# Patient Record
Sex: MALE | Race: WHITE | NOT HISPANIC OR LATINO | Employment: FULL TIME | URBAN - METROPOLITAN AREA
[De-identification: names, ages, dates, MRNs, and addresses within clinical notes are randomized per-mention and may not be internally consistent; named-entity substitution may affect disease eponyms.]

---

## 2017-03-27 ENCOUNTER — OFFICE VISIT (OUTPATIENT)
Dept: FAMILY MEDICINE CLINIC | Facility: CLINIC | Age: 27
End: 2017-03-27

## 2017-03-27 VITALS
WEIGHT: 313 LBS | DIASTOLIC BLOOD PRESSURE: 72 MMHG | TEMPERATURE: 97.7 F | SYSTOLIC BLOOD PRESSURE: 120 MMHG | HEART RATE: 67 BPM | HEIGHT: 78 IN | OXYGEN SATURATION: 95 % | BODY MASS INDEX: 36.21 KG/M2

## 2017-03-27 DIAGNOSIS — N52.9 ERECTILE DYSFUNCTION, UNSPECIFIED ERECTILE DYSFUNCTION TYPE: ICD-10-CM

## 2017-03-27 DIAGNOSIS — Z13.9 SCREENING: Primary | ICD-10-CM

## 2017-03-27 DIAGNOSIS — R79.89 LOW TESTOSTERONE: ICD-10-CM

## 2017-03-27 DIAGNOSIS — E66.9 OBESITY (BMI 35.0-39.9 WITHOUT COMORBIDITY): ICD-10-CM

## 2017-03-27 PROCEDURE — 99214 OFFICE O/P EST MOD 30 MIN: CPT | Performed by: FAMILY MEDICINE

## 2017-03-27 NOTE — PROGRESS NOTES
"SUBJECTIVE:  The patient is a 26-year-old white male who comes in for several issues.  He's not been here in about 2 years.  He is an  and works for the [employer].  He was having decreased libido went to the \"men's clinic\" and had test done which revealed a low testosterone.  He has symptoms of ADD at times as well as the libido issue.     PAST MEDICAL HISTORY:  Reviewed.    REVIEW OF SYSTEMS:  Please see above; 14 point ROS otherwise negative.      OBJECTIVE: Vitals signs are reviewed and are stable.    HEENT: PERRLA.    Neck:  Supple.   No masses or thyromegaly  Lungs:  Clear.    Heart:  Regular rate and rhythm.    Abdomen:   Soft, nontender.   Obese.  Extremities:  No cyanosis, clubbing or edema.      ASSESSMENT:     Obesity with BMI of 36  Decreased libido  Low testosterone  \ED    PLAN:   Patient will be referred to a urologist.  Screening labs are ordered including TSH thyroid profile fasting lipids and CBC.  Testosterone level was not repeated.  He will call his labs.  Diet and exercise are encouraged.  Patient was given samples of Viagra 50 mg one every 24 hours as needed.    Much of this encounter note is an electronic transcription/translation of spoken language to printed text.  The electronic translation of spoken language may permit erroneous, or at times, nonsensical words or phrases to be inadvertently transcribed.  Although I have reviewed the note for such errors, some may still exist.  "

## 2017-03-31 LAB
ALBUMIN SERPL-MCNC: 4.4 G/DL (ref 3.5–5.2)
ALBUMIN/GLOB SERPL: 1.6 G/DL
ALP SERPL-CCNC: 45 U/L (ref 39–117)
ALT SERPL W P-5'-P-CCNC: 50 U/L (ref 1–41)
ANION GAP SERPL CALCULATED.3IONS-SCNC: 15.2 MMOL/L
AST SERPL-CCNC: 28 U/L (ref 1–40)
BILIRUB SERPL-MCNC: 0.7 MG/DL (ref 0.1–1.2)
BUN BLD-MCNC: 14 MG/DL (ref 6–20)
BUN/CREAT SERPL: 15.2 (ref 7–25)
CALCIUM SPEC-SCNC: 9.9 MG/DL (ref 8.6–10.5)
CHLORIDE SERPL-SCNC: 103 MMOL/L (ref 98–107)
CHOLEST SERPL-MCNC: 109 MG/DL (ref 0–200)
CO2 SERPL-SCNC: 22.8 MMOL/L (ref 22–29)
CREAT BLD-MCNC: 0.92 MG/DL (ref 0.76–1.27)
ERYTHROCYTE [DISTWIDTH] IN BLOOD BY AUTOMATED COUNT: 12.2 % (ref 4.5–15)
GFR SERPL CREATININE-BSD FRML MDRD: 98 ML/MIN/1.73
GLOBULIN UR ELPH-MCNC: 2.8 GM/DL
GLUCOSE BLD-MCNC: 87 MG/DL (ref 65–99)
HCT VFR BLD AUTO: 44.3 % (ref 35–60)
HDLC SERPL-MCNC: 29 MG/DL (ref 40–60)
HGB BLD-MCNC: 15 G/DL (ref 13.5–18)
LDLC SERPL CALC-MCNC: 65 MG/DL (ref 0–100)
LDLC/HDLC SERPL: 2.25 {RATIO}
LYMPHOCYTES # BLD AUTO: 1.5 10*3/MM3 (ref 1.2–3.4)
LYMPHOCYTES NFR BLD AUTO: 32.6 % (ref 21–51)
MCH RBC QN AUTO: 30 PG (ref 26.1–33.1)
MCHC RBC AUTO-ENTMCNC: 33.8 G/DL (ref 33–37)
MCV RBC AUTO: 88.8 FL (ref 80–99)
MONOCYTES # BLD AUTO: 0.2 10*3/MM3 (ref 0.1–0.6)
MONOCYTES NFR BLD AUTO: 4.2 % (ref 2–9)
NEUTROPHILS # BLD AUTO: 2.8 10*3/MM3 (ref 1.4–6.5)
NEUTROPHILS NFR BLD AUTO: 63.2 % (ref 42–75)
PLATELET # BLD AUTO: 238 10*3/MM3 (ref 150–450)
PMV BLD AUTO: 8 FL (ref 7.1–10.5)
POTASSIUM BLD-SCNC: 4.2 MMOL/L (ref 3.5–5.2)
PROT SERPL-MCNC: 7.2 G/DL (ref 6–8.5)
RBC # BLD AUTO: 4.99 10*6/MM3 (ref 4–6)
SODIUM BLD-SCNC: 141 MMOL/L (ref 136–145)
T-UPTAKE NFR SERPL: 0.89 TBI (ref 0.8–1.3)
T4 SERPL-MCNC: 7.41 MCG/DL (ref 4.5–11.7)
TRIGL SERPL-MCNC: 74 MG/DL (ref 0–150)
TSH SERPL DL<=0.05 MIU/L-ACNC: 1.02 MIU/ML (ref 0.27–4.2)
VLDLC SERPL-MCNC: 14.8 MG/DL (ref 5–40)
WBC NRBC COR # BLD: 4.5 10*3/MM3 (ref 4.5–10)

## 2017-03-31 PROCEDURE — 80061 LIPID PANEL: CPT | Performed by: FAMILY MEDICINE

## 2017-03-31 PROCEDURE — 84443 ASSAY THYROID STIM HORMONE: CPT | Performed by: FAMILY MEDICINE

## 2017-03-31 PROCEDURE — 80053 COMPREHEN METABOLIC PANEL: CPT | Performed by: FAMILY MEDICINE

## 2017-03-31 PROCEDURE — 84479 ASSAY OF THYROID (T3 OR T4): CPT | Performed by: FAMILY MEDICINE

## 2017-03-31 PROCEDURE — 85025 COMPLETE CBC W/AUTO DIFF WBC: CPT | Performed by: FAMILY MEDICINE

## 2017-03-31 PROCEDURE — 84436 ASSAY OF TOTAL THYROXINE: CPT | Performed by: FAMILY MEDICINE

## 2017-04-01 ENCOUNTER — TELEPHONE (OUTPATIENT)
Dept: FAMILY MEDICINE CLINIC | Facility: CLINIC | Age: 27
End: 2017-04-01

## 2017-04-01 DIAGNOSIS — R79.89 LOW TESTOSTERONE: Primary | ICD-10-CM

## 2017-04-01 DIAGNOSIS — R74.8 ELEVATED LIVER ENZYMES: Primary | ICD-10-CM

## 2017-04-01 NOTE — TELEPHONE ENCOUNTER
----- Message from Chente Baxter MD sent at 4/1/2017  8:43 AM EDT -----  Contact: [phone]  I have made referral  ----- Message -----     From: Madonna Malik MA     Sent: 4/1/2017   7:43 AM       To: Chente Baxter MD        ----- Message -----     From: Laurita Alarcon     Sent: 3/31/2017   9:09 AM       To: Madonna Malik MA    PATIENT WANTS TO KNOW IF HE IS BEING SET UP WITH A UROLOGIST OR IF HE IS WAITING TO GET HIS LABS BACK FIRST.     Lvm informing pt to call back in regards to message

## 2017-10-19 ENCOUNTER — OFFICE VISIT (OUTPATIENT)
Dept: FAMILY MEDICINE CLINIC | Facility: CLINIC | Age: 27
End: 2017-10-19

## 2017-10-19 VITALS
OXYGEN SATURATION: 97 % | HEART RATE: 78 BPM | SYSTOLIC BLOOD PRESSURE: 110 MMHG | WEIGHT: 312.4 LBS | DIASTOLIC BLOOD PRESSURE: 76 MMHG | BODY MASS INDEX: 36.89 KG/M2 | TEMPERATURE: 98 F | RESPIRATION RATE: 16 BRPM | HEIGHT: 77 IN

## 2017-10-19 DIAGNOSIS — M79.675 GREAT TOE PAIN, LEFT: Primary | ICD-10-CM

## 2017-10-19 LAB — URATE SERPL-MCNC: 8.9 MG/DL (ref 3.4–7)

## 2017-10-19 PROCEDURE — 84550 ASSAY OF BLOOD/URIC ACID: CPT | Performed by: NURSE PRACTITIONER

## 2017-10-19 PROCEDURE — 99213 OFFICE O/P EST LOW 20 MIN: CPT | Performed by: NURSE PRACTITIONER

## 2017-10-19 PROCEDURE — 36415 COLL VENOUS BLD VENIPUNCTURE: CPT | Performed by: NURSE PRACTITIONER

## 2017-10-19 RX ORDER — INDOMETHACIN 50 MG/1
50 CAPSULE ORAL
COMMUNITY
Start: 2017-09-01 | End: 2017-10-19 | Stop reason: SDUPTHER

## 2017-10-19 RX ORDER — INDOMETHACIN 50 MG/1
50 CAPSULE ORAL 2 TIMES DAILY WITH MEALS
Qty: 60 CAPSULE | Refills: 1 | Status: SHIPPED | OUTPATIENT
Start: 2017-10-19 | End: 2019-01-02

## 2017-10-19 RX ORDER — COLCHICINE 0.6 MG/1
TABLET ORAL
Qty: 3 TABLET | Refills: 0 | Status: SHIPPED | OUTPATIENT
Start: 2017-10-19 | End: 2019-01-02 | Stop reason: SDUPTHER

## 2017-10-19 NOTE — PROGRESS NOTES
Subjective   Matias Perez is a 27 y.o. male.     History of Present Illness   C/o left great toe pain, he went to [facility] on 9/1/17, toe xrays normal, uric acid normal, he has a sister with gout, he states the pain started this time about 4 days ago, he states the toe is sore, progressively worse, woke up him in the night, sheet or blanket touching caused pain, he was given indocin at [facility] and took, but stopped when he was told gout labs are normal, he states he just returned from vacation, he ate shellfish, drank alcohol, he states he does not usually eat a lot of processed or red meats. He denies any other family hx of gout. He has had 2 episodes of this. He states when pain begins his side of his foot has pain, but usually just around toe, he does notice redness.   The following portions of the patient's history were reviewed and updated as appropriate: allergies, current medications, past family history, past medical history, past social history, past surgical history and problem list.    Review of Systems   Constitutional: Negative for chills, diaphoresis and fever.   Respiratory: Negative for shortness of breath.    Cardiovascular: Negative for chest pain.   Musculoskeletal: Positive for arthralgias. Negative for gait problem, joint swelling and myalgias.   Skin: Negative for pallor.   Neurological: Negative for dizziness, light-headedness and headaches.   All other systems reviewed and are negative.      Objective   Physical Exam   Constitutional: He is oriented to person, place, and time. He appears well-developed and well-nourished.   HENT:   Head: Normocephalic.   Eyes: Pupils are equal, round, and reactive to light.   Neck: Neck supple.   Cardiovascular: Normal rate, regular rhythm, normal heart sounds and intact distal pulses.    Pulmonary/Chest: Effort normal and breath sounds normal.   Musculoskeletal: Normal range of motion.        Neurological: He is alert and oriented to person, place, and  time.   Skin: Skin is warm and dry.   Psychiatric: He has a normal mood and affect. His behavior is normal. Judgment and thought content normal.   Nursing note and vitals reviewed.      Assessment/Plan   Matias was seen today for toe pain.    Diagnoses and all orders for this visit:    Great toe pain, left  -     Uric Acid    Other orders  -     indomethacin (INDOCIN) 50 MG capsule; Take 1 capsule by mouth 2 (Two) Times a Day With Meals.  -     colchicine 0.6 MG tablet; Take 2 tablets today and 1 tablet tomorrow with acute gout attack.      Uric acid today, will call with results.   Deferred foot xray as he had xray on 9/1/17, negative per report.   Encouraged low purine diet, handout given.  Trial colchicine 1.2mg x1 day and 0.6mg x1 day at onset of symptoms for acute gout. Samples and coupon given.  May cont indocin 50mg 2x day with meals as needed for pain.  Elevated and apply ice to foot.  Encourage good footwear.   If pain persists call office.  Increase fluid intake, get plenty of rest.   Patient agrees with plan of care and understands instructions. Call if worsening symptoms or any problems or concerns.

## 2017-10-20 ENCOUNTER — TELEPHONE (OUTPATIENT)
Dept: FAMILY MEDICINE CLINIC | Facility: CLINIC | Age: 27
End: 2017-10-20

## 2017-10-20 NOTE — TELEPHONE ENCOUNTER
----- Message from MARY Mensah sent at 10/20/2017  8:03 AM EDT -----  Please call patient with results. Uric acid is elevated, cont with colchicine and indocin as needed, low purine diet, f/u if symptoms persist.    TRC

## 2017-10-23 ENCOUNTER — TELEPHONE (OUTPATIENT)
Dept: FAMILY MEDICINE CLINIC | Facility: CLINIC | Age: 27
End: 2017-10-23

## 2017-10-23 NOTE — TELEPHONE ENCOUNTER
----- Message from MARY Mensah sent at 10/20/2017  8:03 AM EDT -----  Please call patient with results. Uric acid is elevated, cont with colchicine and indocin as needed, low purine diet, f/u if symptoms persist.    Pt informed of lab results

## 2019-01-01 NOTE — PATIENT INSTRUCTIONS
Low-Purine Diet  Purines are compounds that affect the level of uric acid in your body. A low-purine diet is a diet that is low in purines. Eating a low-purine diet can prevent the level of uric acid in your body from getting too high and causing gout or kidney stones or both.  WHAT DO I NEED TO KNOW ABOUT THIS DIET?  · Choose low-purine foods. Examples of low-purine foods are listed in the next section.  · Drink plenty of fluids, especially water. Fluids can help remove uric acid from your body. Try to drink 8-16 cups (1.9-3.8 L) a day.  · Limit foods high in fat, especially saturated fat, as fat makes it harder for the body to get rid of uric acid. Foods high in saturated fat include pizza, cheese, ice cream, whole milk, fried foods, and gravies. Choose foods that are lower in fat and lean sources of protein. Use olive oil when cooking as it contains healthy fats that are not high in saturated fat.  · Limit alcohol. Alcohol interferes with the elimination of uric acid from your body. If you are having a gout attack, avoid all alcohol.  · Keep in mind that different people's bodies react differently to different foods. You will probably learn over time which foods do or do not affect you. If you discover that a food tends to cause your gout to flare up, avoid eating that food. You can more freely enjoy foods that do not cause problems. If you have any questions about a food item, talk to your dietitian or health care provider.  WHICH FOODS ARE LOW, MODERATE, AND HIGH IN PURINES?  The following is a list of foods that are low, moderate, and high in purines. You can eat any amount of the foods that are low in purines. You may be able to have small amounts of foods that are moderate in purines. Ask your health care provider how much of a food moderate in purines you can have. Avoid foods high in purines.  Grains  · Foods low in purines: Enriched white bread, pasta, rice, cake, cornbread, popcorn.  · Foods moderate in  purines: Whole-grain breads and cereals, wheat germ, bran, oatmeal. Uncooked oatmeal. Dry wheat bran or wheat germ.  · Foods high in purines: Pancakes, Slovak toast, biscuits, muffins.  Vegetables  · Foods low in purines: All vegetables, except those that are moderate in purines.  · Foods moderate in purines: Asparagus, cauliflower, spinach, mushrooms, green peas.  Fruits  · All fruits are low in purines.  Meats and other Protein Foods  · Foods low in purines: Eggs, nuts, peanut butter.  · Foods moderate in purines: 80-90% lean beef, lamb, veal, pork, poultry, fish, eggs, peanut butter, nuts. Crab, lobster, oysters, and shrimp. Cooked dried beans, peas, and lentils.  · Foods high in purines: Anchovies, sardines, herring, mussels, tuna, codfish, scallops, trout, and lluvia. Estrella. Organ meats (such as liver or kidney). Tripe. Game meat. Goose. Sweetbreads.  Dairy  · All dairy foods are low in purines. Low-fat and fat-free dairy products are best because they are low in saturated fat.  Beverages  · Drinks low in purines: Water, carbonated beverages, tea, coffee, cocoa.  · Drinks moderate in purines: Soft drinks and other drinks sweetened with high-fructose corn syrup. Juices. To find whether a food or drink is sweetened with high-fructose corn syrup, look at the ingredients list.  · Drinks high in purines: Alcoholic beverages (such as beer).  Condiments  · Foods low in purines: Salt, herbs, olives, pickles, relishes, vinegar.  · Foods moderate in purines: Butter, margarine, oils, mayonnaise.  Fats and Oils  · Foods low in purines: All types, except gravies and sauces made with meat.  · Foods high in purines: Gravies and sauces made with meat.  Other Foods  · Foods low in purines: Sugars, sweets, gelatin. Cake. Soups made without meat.  · Foods moderate in purines: Meat-based or fish-based soups, broths, or bouillons. Foods and drinks sweetened with high-fructose corn syrup.  · Foods high in purines: High-fat desserts  (such as ice cream, cookies, cakes, pies, doughnuts, and chocolate).  Contact your dietitian for more information on foods that are not listed here.     This information is not intended to replace advice given to you by your health care provider. Make sure you discuss any questions you have with your health care provider.     Document Released: 04/13/2012 Document Revised: 12/23/2014 Document Reviewed: 11/24/2014  [employer] Interactive Patient Education ©2017 Elsevier Inc.    Uric acid today,  Deferred foot xray as he had xray on 9/1/17, negative per report.   Encouraged low purine diet, handout given.  Trial colchicine 1.2mg x1 day and 0.6mg x1 day at onset of symptoms for acute gout. Samples and coupon given.  May cont indocin 50mg 2x day with meals as needed for pain.  Elevated and apply ice to foot.  Encourage good footwear.   If pain persists call office.  Increase fluid intake, get plenty of rest.   Patient agrees with plan of care and understands instructions. Call if worsening symptoms or any problems or concerns.      I will STOP taking the medications listed below when I get home from the hospital:  None

## 2019-01-02 ENCOUNTER — OFFICE VISIT (OUTPATIENT)
Dept: FAMILY MEDICINE CLINIC | Facility: CLINIC | Age: 29
End: 2019-01-02

## 2019-01-02 VITALS
DIASTOLIC BLOOD PRESSURE: 88 MMHG | BODY MASS INDEX: 36.45 KG/M2 | OXYGEN SATURATION: 97 % | SYSTOLIC BLOOD PRESSURE: 138 MMHG | WEIGHT: 315 LBS | HEIGHT: 78 IN | TEMPERATURE: 98.1 F | HEART RATE: 93 BPM

## 2019-01-02 DIAGNOSIS — R74.8 ELEVATED LIVER ENZYMES: ICD-10-CM

## 2019-01-02 DIAGNOSIS — M1A.9XX0 CHRONIC GOUT INVOLVING TOE OF LEFT FOOT WITHOUT TOPHUS, UNSPECIFIED CAUSE: ICD-10-CM

## 2019-01-02 DIAGNOSIS — E66.01 CLASS 2 SEVERE OBESITY DUE TO EXCESS CALORIES WITH SERIOUS COMORBIDITY AND BODY MASS INDEX (BMI) OF 38.0 TO 38.9 IN ADULT (HCC): ICD-10-CM

## 2019-01-02 DIAGNOSIS — J02.9 PHARYNGITIS, UNSPECIFIED ETIOLOGY: Primary | ICD-10-CM

## 2019-01-02 DIAGNOSIS — B96.89 BACTERIAL CONJUNCTIVITIS OF BOTH EYES: ICD-10-CM

## 2019-01-02 DIAGNOSIS — H10.9 BACTERIAL CONJUNCTIVITIS OF BOTH EYES: ICD-10-CM

## 2019-01-02 DIAGNOSIS — J01.00 ACUTE MAXILLARY SINUSITIS, RECURRENCE NOT SPECIFIED: ICD-10-CM

## 2019-01-02 LAB
ALBUMIN SERPL-MCNC: 4.2 G/DL (ref 3.5–5.2)
ALBUMIN/GLOB SERPL: 1.1 G/DL
ALP SERPL-CCNC: 46 U/L (ref 39–117)
ALT SERPL W P-5'-P-CCNC: 116 U/L (ref 1–41)
ANION GAP SERPL CALCULATED.3IONS-SCNC: 12.6 MMOL/L
AST SERPL-CCNC: 56 U/L (ref 1–40)
BILIRUB SERPL-MCNC: 0.4 MG/DL (ref 0.1–1.2)
BUN BLD-MCNC: 16 MG/DL (ref 6–20)
BUN/CREAT SERPL: 15.1 (ref 7–25)
CALCIUM SPEC-SCNC: 9.7 MG/DL (ref 8.6–10.5)
CHLORIDE SERPL-SCNC: 104 MMOL/L (ref 98–107)
CO2 SERPL-SCNC: 26.4 MMOL/L (ref 22–29)
CREAT BLD-MCNC: 1.06 MG/DL (ref 0.76–1.27)
ERYTHROCYTE [DISTWIDTH] IN BLOOD BY AUTOMATED COUNT: 12.3 % (ref 4.5–15)
GFR SERPL CREATININE-BSD FRML MDRD: 82 ML/MIN/1.73
GLOBULIN UR ELPH-MCNC: 3.8 GM/DL
GLUCOSE BLD-MCNC: 120 MG/DL (ref 65–99)
HCT VFR BLD AUTO: 51.3 % (ref 35–60)
HETEROPH AB SER QL LA: NEGATIVE
HGB BLD-MCNC: 16.8 G/DL (ref 13.5–18)
LYMPHOCYTES # BLD AUTO: 3 10*3/MM3 (ref 1.2–3.4)
LYMPHOCYTES NFR BLD AUTO: 26.1 % (ref 21–51)
MCH RBC QN AUTO: 28.9 PG (ref 26.1–33.1)
MCHC RBC AUTO-ENTMCNC: 32.7 G/DL (ref 33–37)
MCV RBC AUTO: 88.3 FL (ref 80–99)
MONOCYTES # BLD AUTO: 0.6 10*3/MM3 (ref 0.1–0.6)
MONOCYTES NFR BLD AUTO: 5.6 % (ref 2–9)
NEUTROPHILS # BLD AUTO: 7.9 10*3/MM3 (ref 1.4–6.5)
NEUTROPHILS NFR BLD AUTO: 68.3 % (ref 42–75)
PLATELET # BLD AUTO: 337 10*3/MM3 (ref 150–450)
PMV BLD AUTO: 7.3 FL (ref 7.1–10.5)
POTASSIUM BLD-SCNC: 4 MMOL/L (ref 3.5–5.2)
PROT SERPL-MCNC: 8 G/DL (ref 6–8.5)
RBC # BLD AUTO: 5.81 10*6/MM3 (ref 4–6)
S PYO AG THROAT QL: NEGATIVE
SODIUM BLD-SCNC: 143 MMOL/L (ref 136–145)
URATE SERPL-MCNC: 7.9 MG/DL (ref 3.4–7)
WBC NRBC COR # BLD: 11.6 10*3/MM3 (ref 4.5–10)

## 2019-01-02 PROCEDURE — 36415 COLL VENOUS BLD VENIPUNCTURE: CPT | Performed by: NURSE PRACTITIONER

## 2019-01-02 PROCEDURE — 84550 ASSAY OF BLOOD/URIC ACID: CPT | Performed by: NURSE PRACTITIONER

## 2019-01-02 PROCEDURE — 87880 STREP A ASSAY W/OPTIC: CPT | Performed by: NURSE PRACTITIONER

## 2019-01-02 PROCEDURE — 85025 COMPLETE CBC W/AUTO DIFF WBC: CPT | Performed by: NURSE PRACTITIONER

## 2019-01-02 PROCEDURE — 86308 HETEROPHILE ANTIBODY SCREEN: CPT | Performed by: NURSE PRACTITIONER

## 2019-01-02 PROCEDURE — 99214 OFFICE O/P EST MOD 30 MIN: CPT | Performed by: NURSE PRACTITIONER

## 2019-01-02 PROCEDURE — 80053 COMPREHEN METABOLIC PANEL: CPT | Performed by: NURSE PRACTITIONER

## 2019-01-02 RX ORDER — AZITHROMYCIN 250 MG/1
TABLET, FILM COATED ORAL
Qty: 6 TABLET | Refills: 0 | Status: SHIPPED | OUTPATIENT
Start: 2019-01-02 | End: 2022-07-08

## 2019-01-02 RX ORDER — COLCHICINE 0.6 MG/1
TABLET ORAL
Qty: 3 TABLET | Refills: 0 | Status: SHIPPED | OUTPATIENT
Start: 2019-01-02 | End: 2019-06-18 | Stop reason: SDUPTHER

## 2019-01-02 RX ORDER — GENTAMICIN SULFATE 3 MG/ML
1 SOLUTION/ DROPS OPHTHALMIC EVERY 4 HOURS
Qty: 1 EACH | Refills: 0 | Status: SHIPPED | OUTPATIENT
Start: 2019-01-02 | End: 2022-07-08

## 2019-01-02 NOTE — PATIENT INSTRUCTIONS
check labs and call with results, erx zpack use as directed, Change toothbrush in 24 hours, warm salt water rinses, enc tylenol prn, erx gentamicin eye drops, increase H20 and rest, call or RTC if sx persist or worsen, refill cochicine, gave low purine diet, Enc healthy diet and regular exercise for wt loss  Low-Purine Diet  Purines are compounds that affect the level of uric acid in your body. A low-purine diet is a diet that is low in purines. Eating a low-purine diet can prevent the level of uric acid in your body from getting too high and causing gout or kidney stones or both.  What do I need to know about this diet?  · Choose low-purine foods. Examples of low-purine foods are listed in the next section.  · Drink plenty of fluids, especially water. Fluids can help remove uric acid from your body. Try to drink 8-16 cups (1.9-3.8 L) a day.  · Limit foods high in fat, especially saturated fat, as fat makes it harder for the body to get rid of uric acid. Foods high in saturated fat include pizza, cheese, ice cream, whole milk, fried foods, and gravies. Choose foods that are lower in fat and lean sources of protein. Use olive oil when cooking as it contains healthy fats that are not high in saturated fat.  · Limit alcohol. Alcohol interferes with the elimination of uric acid from your body. If you are having a gout attack, avoid all alcohol.  · Keep in mind that different people’s bodies react differently to different foods. You will probably learn over time which foods do or do not affect you. If you discover that a food tends to cause your gout to flare up, avoid eating that food. You can more freely enjoy foods that do not cause problems. If you have any questions about a food item, talk to your dietitian or health care provider.  Which foods are low, moderate, and high in purines?  The following is a list of foods that are low, moderate, and high in purines. You can eat any amount of the foods that are low in  purines. You may be able to have small amounts of foods that are moderate in purines. Ask your health care provider how much of a food moderate in purines you can have. Avoid foods high in purines.  Grains  · Foods low in purines: Enriched white bread, pasta, rice, cake, cornbread, popcorn.  · Foods moderate in purines: Whole-grain breads and cereals, wheat germ, bran, oatmeal. Uncooked oatmeal. Dry wheat bran or wheat germ.  · Foods high in purines: Pancakes, Spanish toast, biscuits, muffins.  Vegetables  · Foods low in purines: All vegetables, except those that are moderate in purines.  · Foods moderate in purines: Asparagus, cauliflower, spinach, mushrooms, green peas.  Fruits  · All fruits are low in purines.  Meats and other Protein Foods  · Foods low in purines: Eggs, nuts, peanut butter.  · Foods moderate in purines: 80-90% lean beef, lamb, veal, pork, poultry, fish, eggs, peanut butter, nuts. Crab, lobster, oysters, and shrimp. Cooked dried beans, peas, and lentils.  · Foods high in purines: Anchovies, sardines, herring, mussels, tuna, codfish, scallops, trout, and lluvia. Estrella. Organ meats (such as liver or kidney). Tripe. Game meat. Goose. Sweetbreads.  Dairy  · All dairy foods are low in purines. Low-fat and fat-free dairy products are best because they are low in saturated fat.  Beverages  · Drinks low in purines: Water, carbonated beverages, tea, coffee, cocoa.  · Drinks moderate in purines: Soft drinks and other drinks sweetened with high-fructose corn syrup. Juices. To find whether a food or drink is sweetened with high-fructose corn syrup, look at the ingredients list.  · Drinks high in purines: Alcoholic beverages (such as beer).  Condiments  · Foods low in purines: Salt, herbs, olives, pickles, relishes, vinegar.  · Foods moderate in purines: Butter, margarine, oils, mayonnaise.  Fats and Oils  · Foods low in purines: All types, except gravies and sauces made with meat.  · Foods high in purines:  Gravies and sauces made with meat.  Other Foods  · Foods low in purines: Sugars, sweets, gelatin. Cake. Soups made without meat.  · Foods moderate in purines: Meat-based or fish-based soups, broths, or bouillons. Foods and drinks sweetened with high-fructose corn syrup.  · Foods high in purines: High-fat desserts (such as ice cream, cookies, cakes, pies, doughnuts, and chocolate).  Contact your dietitian for more information on foods that are not listed here.  This information is not intended to replace advice given to you by your health care provider. Make sure you discuss any questions you have with your health care provider.  Document Released: 04/13/2012 Document Revised: 05/25/2017 Document Reviewed: 11/24/2014  [employer] Interactive Patient Education © 2017 Elsevier Inc.

## 2019-01-02 NOTE — PROGRESS NOTES
Subjective   Matias Perez is a 28 y.o. male.     History of Present Illness   C/o body aches and fatigue beginning last week 12/24/18 developed into prod cough and itchy eyes and swelling with redness and crusting, went to [facility] 12/29/18 thought viral URI tx astelin NS, zyrtec and promethazine DM cough syrup, then woke up the next day with clear blisters internal lips and then called [facility] started on tessalon perles, medrol dose pack, states still with sx and now with sore throat, sinus congestion and tenderness, HA, no recent strep testing, wife also sick at home, no known sick exposure, Allergic PCN, no NV diarrhea or fevers  With chronic gout L first toe with approximately 2 episodes over the last year, with last uric acid 7.9 10/17 has taken allopurinol in past but not daily, has colchicine at home request refill prn, last labs CMP 50 ALT 03/17    The following portions of the patient's history were reviewed and updated as appropriate: allergies, current medications, past family history, past medical history, past social history, past surgical history and problem list.    Review of Systems   Constitutional: Negative for fever.   HENT: Positive for congestion, facial swelling, mouth sores, postnasal drip, sinus pressure, sinus pain and sore throat. Negative for dental problem, drooling, ear discharge, ear pain, hearing loss, nosebleeds, rhinorrhea, sneezing, tinnitus, trouble swallowing and voice change.    Eyes: Positive for discharge, redness and visual disturbance. Negative for photophobia, pain and itching.   Respiratory: Positive for cough, shortness of breath and wheezing. Negative for apnea, choking, chest tightness and stridor.    Cardiovascular: Negative for chest pain, palpitations and leg swelling.   Musculoskeletal: Positive for arthralgias. Negative for back pain, gait problem, joint swelling, myalgias, neck pain and neck stiffness.   Allergic/Immunologic: Positive for environmental  allergies.   Neurological: Positive for headaches. Negative for dizziness.   All other systems reviewed and are negative.      Objective   Physical Exam   Constitutional: He is oriented to person, place, and time. He appears well-developed and well-nourished.   HENT:   Head: Normocephalic and atraumatic.   Right Ear: Hearing and tympanic membrane normal.   Left Ear: Hearing and tympanic membrane normal.   Nose: Mucosal edema present. Right sinus exhibits maxillary sinus tenderness and frontal sinus tenderness. Left sinus exhibits maxillary sinus tenderness and frontal sinus tenderness.   Mouth/Throat: Oral lesions present. Posterior oropharyngeal erythema present. No posterior oropharyngeal edema.   Eyes: Conjunctivae and EOM are normal. Pupils are equal, round, and reactive to light.   Neck: Normal range of motion. Neck supple. No thyromegaly present.   Cardiovascular: Normal rate, regular rhythm and normal heart sounds.   Pulmonary/Chest: Effort normal and breath sounds normal.   Abdominal: Soft. He exhibits no distension and no mass. There is no tenderness. There is no rebound, no guarding and no CVA tenderness. No hernia.   Musculoskeletal: Normal range of motion.   Lymphadenopathy:     He has cervical adenopathy.   Neurological: He is alert and oriented to person, place, and time.   Skin: Skin is warm and dry.   Psychiatric: He has a normal mood and affect. His behavior is normal. Judgment and thought content normal.   Vitals reviewed.      Assessment/Plan   Matias was seen today for follow-up.    Diagnoses and all orders for this visit:    Pharyngitis, unspecified etiology  -     Rapid Strep A Screen - Swab, Throat  -     Mononucleosis Screen    Acute maxillary sinusitis, recurrence not specified    Bacterial conjunctivitis of both eyes    Chronic gout involving toe of left foot without tophus, unspecified cause  -     CBC & Differential  -     Comprehensive Metabolic Panel  -     Uric Acid  -     CBC Auto  Differential    Elevated liver enzymes  -     Comprehensive Metabolic Panel    Class 2 severe obesity due to excess calories with serious comorbidity and body mass index (BMI) of 38.0 to 38.9 in adult (CMS/[facility])    Other orders  -     colchicine 0.6 MG tablet; Take 2 tablets today and 1 tablet tomorrow with acute gout attack.  -     azithromycin (ZITHROMAX Z-EJ) 250 MG tablet; Take 2 tablets the first day, then 1 tablet daily for 4 days.  -     gentamicin (GARAMYCIN) 0.3 % ophthalmic solution; Administer 1 drop to both eyes Every 4 (Four) Hours. X 1 wk    check labs and call with results, erx zpack use as directed, Change toothbrush in 24 hours, warm salt water rinses, enc tylenol prn, erx gentamicin eye drops, increase H20 and rest, call or RTC if sx persist or worsen, refill cochicine, gave low purine diet, Enc healthy diet and regular exercise for wt loss

## 2019-01-04 ENCOUNTER — TELEPHONE (OUTPATIENT)
Dept: FAMILY MEDICINE CLINIC | Facility: CLINIC | Age: 29
End: 2019-01-04

## 2019-01-04 DIAGNOSIS — R73.9 HYPERGLYCEMIA: Primary | ICD-10-CM

## 2019-01-04 DIAGNOSIS — R74.8 ELEVATED LIVER ENZYMES: ICD-10-CM

## 2019-01-04 LAB — HBA1C MFR BLD: 5.79 % (ref 4.8–5.6)

## 2019-01-04 PROCEDURE — 83036 HEMOGLOBIN GLYCOSYLATED A1C: CPT | Performed by: NURSE PRACTITIONER

## 2019-01-04 PROCEDURE — 36415 COLL VENOUS BLD VENIPUNCTURE: CPT | Performed by: NURSE PRACTITIONER

## 2019-01-04 NOTE — TELEPHONE ENCOUNTER
Uric acid still high 7.9 but decreased from 8.9 encourage healthy low purine diet and increase H20, we can restart allopurinol 100 mg daily if you would like or if you aren't having much breakthrough we can monitor and do colchicine prn.   BS elevated 120 enc healthy diet and regular exercise for wt loss and we can monitor. Prediabetic.  Liver enzymes still elevated, need to RTC check hepatitis panel

## 2019-05-20 ENCOUNTER — TELEPHONE (OUTPATIENT)
Dept: FAMILY MEDICINE CLINIC | Facility: CLINIC | Age: 29
End: 2019-05-20

## 2019-05-20 NOTE — TELEPHONE ENCOUNTER
I have not seen him for some time.  I do not think I have treated him for gout.  If the others that have treated him since me want to called and they can.  If not I would probably need to see him.

## 2019-05-20 NOTE — TELEPHONE ENCOUNTER
Pt returned call and was informed. He said he never picked up the gout med rx last time a couple of months ago. I told him to call the pharmacy to see if he can pick that up.

## 2019-06-18 RX ORDER — COLCHICINE 0.6 MG/1
TABLET ORAL
Qty: 30 TABLET | Refills: 0 | Status: SHIPPED | OUTPATIENT
Start: 2019-06-18 | End: 2022-07-08

## 2019-12-09 ENCOUNTER — OFFICE VISIT (OUTPATIENT)
Dept: ORTHOPEDIC SURGERY | Facility: CLINIC | Age: 29
End: 2019-12-09

## 2019-12-09 VITALS — WEIGHT: 315 LBS | TEMPERATURE: 97.8 F | BODY MASS INDEX: 36.45 KG/M2 | HEIGHT: 78 IN

## 2019-12-09 DIAGNOSIS — M25.551 HIP PAIN, RIGHT: Primary | ICD-10-CM

## 2019-12-09 DIAGNOSIS — M70.61 TROCHANTERIC BURSITIS OF RIGHT HIP: ICD-10-CM

## 2019-12-09 PROCEDURE — 73502 X-RAY EXAM HIP UNI 2-3 VIEWS: CPT | Performed by: ORTHOPAEDIC SURGERY

## 2019-12-09 PROCEDURE — 99203 OFFICE O/P NEW LOW 30 MIN: CPT | Performed by: ORTHOPAEDIC SURGERY

## 2019-12-09 NOTE — PROGRESS NOTES
"Patient Name: Matias Perez   YOB: 1990  Referring Primary Care Physician: Chente Baxter MD  BMI: Body mass index is 39.82 kg/m².    Chief Complaint:    Chief Complaint   Patient presents with   • Right Hip - Establish Care        HPI:     Matias Perez is a 29 y.o. male who presents today for evaluation of   Chief Complaint   Patient presents with   • Right Hip - Establish Care   .  Patient is seen today complaining of atraumatic \"right hip\" pain.  He points laterally along his hip and says is been going on for about a year its intermittent.  It does bother him some at night.  He says for work he does sit down work as an .  He has not had any treatment he denies any therapy medications etc.    This problem is new to this examiner.     Subjective   Medications:   Home Medications:  Current Outpatient Medications on File Prior to Visit   Medication Sig   • CLOMIPHENE CITRATE PO Take  by mouth.   • colchicine 0.6 MG tablet Take 2 tablets today and 1 tablet tomorrow with acute gout attack.   • MULTIPLE VITAMINS-MINERALS PO Take  by mouth.   • azithromycin (ZITHROMAX Z-EJ) 250 MG tablet Take 2 tablets the first day, then 1 tablet daily for 4 days.   • gentamicin (GARAMYCIN) 0.3 % ophthalmic solution Administer 1 drop to both eyes Every 4 (Four) Hours. X 1 wk     No current facility-administered medications on file prior to visit.      Current Medications:  Scheduled Meds:  Continuous Infusions:  No current facility-administered medications for this visit.   PRN Meds:.    I have reviewed the patient's medical history in detail and updated the computerized patient record.  Review and summarization of old records includes:    Past Medical History:   Diagnosis Date   • Kidney stones 2010   • Sleep apnea         Past Surgical History:   Procedure Laterality Date   • ARM WOUND REPAIR / CLOSURE      Broken arm        Social History     Occupational History   • Not on file   Tobacco Use   • Smoking " "status: Never Smoker   • Smokeless tobacco: Never Used   Substance and Sexual Activity   • Alcohol use: Yes     Comment: social    • Drug use: No   • Sexual activity: Not on file    Social History     Social History Narrative   • Not on file        Family History   Problem Relation Age of Onset   • Allergies Mother    • Hypertension Father    • Thyroid cancer Sister    • Heart disease Maternal Grandfather    • Diabetes Maternal Grandfather        ROS: 14 point review of systems was performed and all other systems were reviewed and are negative except for documented findings in HPI and today's encounter.     Allergies:   Allergies   Allergen Reactions   • Penicillins Swelling     Constitutional:  Denies fever, shaking or chills   Eyes:  Denies change in visual acuity   HENT:  Denies nasal congestion or sore throat   Respiratory:  Denies cough or shortness of breath   Cardiovascular:  Denies chest pain or severe LE edema   GI:  Denies abdominal pain, nausea, vomiting, bloody stools or diarrhea   Musculoskeletal:  Numbness, tingling, pain, or loss of motor function only as noted above in history of present illness.  : Denies painful urination or hematuria  Integument:  Denies rash, lesion or ulceration   Neurologic:  Denies headache or focal weakness  Endocrine:  Denies lymphadenopathy  Psych:  Denies confusion or change in mental status   Hem:  Denies active bleeding    OBJECTIVE:  Physical Exam: 29 y.o. male  Wt Readings from Last 3 Encounters:   12/09/19 (!) 156 kg (344 lb 9.6 oz)   01/02/19 (!) 150 kg (330 lb)   10/19/17 (!) 142 kg (312 lb 6.4 oz)     Ht Readings from Last 1 Encounters:   12/09/19 198.1 cm (78\")     Body mass index is 39.82 kg/m².  Vitals:    12/09/19 0904   Temp: 97.8 °F (36.6 °C)     Vital signs reviewed.     General Appearance:    Alert, cooperative, in no acute distress                  Eyes: conjunctiva clear  ENT: external ears and nose atraumatic  CV: no peripheral edema  Resp: normal " respiratory effort  Skin: no rashes or wounds; normal turgor  Psych: mood and affect appropriate  Lymph: no nodes appreciated  Neuro: gross sensation intact  Vascular:  Palpable peripheral pulse in noted extremity  Musculoskeletal Extremities: Examination shows pleasant man he has mild tenderness over his greater trochanter on the right this is where he indicates the pain has been says is been doing better as of late he does have a little stiffness bilaterally in his hips to rotation but no tenderness to axial loading or at extremes of motion.  He walks and transfers while getting on and off the table SI joints not particularly tender parison exam made on the other side    Radiology:   AP lateral x-rays of the right hip without comparison view show very early or mild arthritic changes but overall good maintenance of his joint spaces    Assessment:     ICD-10-CM ICD-9-CM   1. Hip pain, right M25.551 719.45   2. Trochanteric bursitis of right hip M70.61 726.5        Procedures       Plan: Biomechanics of pertinent body area discussed.  Risks, benefits, alternatives, comparisons, and complications of accepted medicines, injections, recommendations, surgical procedures, and therapies explained and education provided in laymen's terms. Natural history and expected course of this patient's diagnosis discussed along with evaluation of therapies. Questions answered. When appropriate I also discussed proper use of cane, walker, trekking poles.   MEDICATIONS:  Prescription, OTC and Monitoring of Medications per orders to address ortho complaints; Evaluation and discussion of safety, precautions, side effects, and warnings given especially of long term NSAID or steroid therapy.    RICE: Rest, ice, compression, and elevation therapy, Cryotherapy/brachy therapy, and or OTC linaments as indicated with instructions.   CONSULT: This Consult is done at the request of a requesting provider to whom I will send this report with this  rendered opinion.Explained to him was going on a recommended control of inflammation if it recurs.  Is largely doing better at this time.  If it recurs and bothers him significantly would recommend trying formal physical therapy which was offered and if that failed to get him better have him see the sports medicine doctors for evaluation and treatment possible injection and he does not have a surgical condition at this time  If not better should see sports med for injections    12/9/2019    Much of this encounter note is an electronic transcription/translation of spoken language to printed text. The electronic translation of spoken language may permit erroneous, or at times, nonsensical words or phrases to be inadvertently transcribed; Although I have reviewed the note for such errors, some may still exist

## 2020-03-24 ENCOUNTER — TELEPHONE (OUTPATIENT)
Dept: FAMILY MEDICINE CLINIC | Facility: CLINIC | Age: 30
End: 2020-03-24

## 2020-03-24 ENCOUNTER — NURSE TRIAGE (OUTPATIENT)
Dept: CALL CENTER | Facility: HOSPITAL | Age: 30
End: 2020-03-24

## 2020-03-24 NOTE — TELEPHONE ENCOUNTER
"    Reason for Disposition  • [1] Cough occurs AND [2] within 14 days of COVID-19 EXPOSURE    Additional Information  • Negative: Severe difficulty breathing (e.g., struggling for each breath, speak in single words, bluish lips)  • Negative: Sounds like a life-threatening emergency to the triager  • Negative: [1] Difficulty breathing (shortness of breath) occurs AND [2] onset > 14 days after COVID-19 EXPOSURE (Close Contact)  • Negative: [1] Dry cough occurs AND [2] onset > 14 days after COVID-19 EXPOSURE  • Negative: [1] Wet cough (i.e., white-yellow, yellow, green, or alvin colored sputum) AND [2] onset > 14 days after COVID-19 EXPOSURE  • Negative: [1] Common cold symptoms AND [2] onset > 14 days after COVID-19 EXPOSURE  • Negative: [1] Difficulty breathing occurs AND [2] within 14 days of COVID-19 EXPOSURE (Close Contact)  • Negative: Patient sounds very sick or weak to the triager  • Negative: [1] Fever or feeling feverish AND [2] within 14 Days of COVID-19 EXPOSURE (Close Contact)    Answer Assessment - Initial Assessment Questions  1. CONFIRMED CASE: \"Who is the person with the confirmed COVID-19 infection that you were exposed to?\"      Patient has been in general public over last 14 days. His coworker's wife was at a work conference in [place], [place] where 30 participants were positive for Covid 19, late February and he was exposed to coworker. Pt was last exposed to her  3-9-2020.       2. PLACE of CONTACT: \"Where were you when you were exposed to COVID-19  (coronavirus disease 2019)?\" (e.g., city, state, country)      [place], [place]    3. TYPE of CONTACT: \"How much contact was there?\" (e.g., live in same house, work in same office, same school)      Work in same office, hallways, general exposure.     4. DATE of CONTACT: \"When did you have contact with a coronavirus patient?\" (e.g., days)      Most recent public exposure 3/21/2020    5. DURATION of CONTACT: \"How long were you in contact with the " "COVID-19 (coronavirus disease) patient?\" (e.g., a few seconds, passed by person, a few minutes, live with the patient)      Patient works 8 hour days     6. SYMPTOMS: \"Do you have any symptoms?\" (e.g., fever, cough, breathing difficulty)      Nasal congestion but upper throat/chest congestion. He occasionally feels intense sinus pressure, chest pressure. Cough has had dry cough but today began coughing green phlegm, thick consistency. Pt woke several times overnight from what sounded like rattling in throat/airway, similar to wheezing but not starving for air. Patient has checked temp orally 99.-100.0 but having chilling and sweating since 3/22/2020. Denies SOA but does feel more difficult to breath than normal. Patient occas has runny nose, did have sore throat but is improved now. Pt having fatigue, weak, constant headache that has not been relieved with Ibuprofen or cold and flu meds that he has taken, none have helped, last dose 3/23/2020 evening.  Pt reports on/off diarrhea recently. Pt also reports dizziness when standing quickly. Appetite and fluid intake decreased.  Wife is also sick.       7. PREGNANCY OR POSTPARTUM: \"Is there any chance you are pregnant?\" \"When was your last menstrual period?\" \"Did you deliver in the last 2 weeks?\"      NA    8. HIGH RISK: \"Do you have any heart or lung problems? Do you have a weakened immune system?\" (e.g., CHF, COPD, asthma, HIV positive, chemotherapy, renal failure, diabetes mellitus, sickle cell anemia)      Pt reports 2yr laryngitis as child that require inhaler but denies reactive airway or asthma diagnosis. Patient denies Cardiac disease, immuno therapy or immune disorder or HIV.    Protocols used: CORONAVIRUS (COVID-19) EXPOSURE-ADULT-AH      "

## 2020-03-24 NOTE — TELEPHONE ENCOUNTER
Called patient 03/24/20 1:01 regarding recent evisit today, states having fever around 100 but not high fever but chills and hot flashes especially worse HS. States has had sinus and cold sx beginning last week and wife also sick last week went [place] [facility] [place] and [place] normal. Denies SOA but wheezing and chest tightness, has prod cough and fatigued and HA. 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 Has allergies and has done shots in past doesn't think sinus pain and sore throat as bad as when prev tx abx 01/20 but some swollen lymph nodes. Overall feels fine monitoring at home, gave [facility] phone number and address prn if worsens, will monitor and already has note off work. Call prn       PATIENT CALLED IN AND STATED HE WAS TALKING TO A NURSE TRIAGE TEAM MEMBER WHO STATED HE MIGHT WANT TO SET UP A  VIRTUAL APPOINTMENT WITH  DOCTOR BEYER . PATIENT WANTED A CALL BACK ON WHAT HE SHOULD DO . PATIENT CALL BACK [phone].

## 2020-04-24 ENCOUNTER — TELEPHONE (OUTPATIENT)
Dept: FAMILY MEDICINE CLINIC | Facility: CLINIC | Age: 30
End: 2020-04-24

## 2020-04-24 NOTE — TELEPHONE ENCOUNTER
PATIENT CALLING TO SEE WHERE HE CAN HAVE THE COVID-19 ANTIBODIES TEST DONE TO SEE IF HE PREVIOUSLY HAD COVID-19.    PLEASE CALL AND ADVISE -[phone].

## 2021-04-16 ENCOUNTER — BULK ORDERING (OUTPATIENT)
Dept: CASE MANAGEMENT | Facility: OTHER | Age: 31
End: 2021-04-16

## 2021-04-16 DIAGNOSIS — Z23 IMMUNIZATION DUE: ICD-10-CM

## 2022-07-08 ENCOUNTER — OFFICE VISIT (OUTPATIENT)
Dept: FAMILY MEDICINE CLINIC | Facility: CLINIC | Age: 32
End: 2022-07-08

## 2022-07-08 VITALS
TEMPERATURE: 98 F | HEART RATE: 81 BPM | RESPIRATION RATE: 20 BRPM | BODY MASS INDEX: 36.45 KG/M2 | OXYGEN SATURATION: 98 % | SYSTOLIC BLOOD PRESSURE: 120 MMHG | HEIGHT: 78 IN | WEIGHT: 315 LBS | DIASTOLIC BLOOD PRESSURE: 70 MMHG

## 2022-07-08 DIAGNOSIS — R59.0 ENLARGED LYMPH NODE IN NECK: ICD-10-CM

## 2022-07-08 DIAGNOSIS — Z48.02 ENCOUNTER FOR REMOVAL OF SUTURES: Primary | ICD-10-CM

## 2022-07-08 PROCEDURE — 99203 OFFICE O/P NEW LOW 30 MIN: CPT | Performed by: NURSE PRACTITIONER

## 2022-07-08 NOTE — PROGRESS NOTES
"Chief Complaint  Suture / Staple Removal (Right wrist 4 sutures 12 days ago at [facility]) and lympg node enlarged (On right side gets larger if sick but alwaqys swollen)    Subjective        Matias Perez presents to [facility]  History of Present Illness   32-year-old male, patient of Dr. Baxter, new to me, presenting for suture removal, he was seen at [facility] on 6/26/2022 for laceration of right wrist related to metal blind falling on wrist and cutting.  He was discharged with instructions for suture removal in 10 to 14 days.  He also complains of enlarged lymph node right neck, states always more swollen when sick but stays enlarged all the time, denies pain or tenderness.     Objective   Vital Signs:  /70 (BP Location: Left arm, Patient Position: Sitting)   Pulse 81   Temp 98 °F (36.7 °C) (Infrared)   Resp 20   Ht 198.1 cm (78\")   Wt (!) 149 kg (328 lb)   SpO2 98%   BMI 37.90 kg/m²   Estimated body mass index is 37.9 kg/m² as calculated from the following:    Height as of this encounter: 198.1 cm (78\").    Weight as of this encounter: 149 kg (328 lb).          Physical Exam  Neck:      Comments: Right upper neck node enlarged  Cardiovascular:      Rate and Rhythm: Normal rate.      Pulses: Normal pulses.      Heart sounds: Normal heart sounds.   Pulmonary:      Effort: Pulmonary effort is normal.      Breath sounds: Normal breath sounds.   Lymphadenopathy:      Cervical: Cervical adenopathy present.   Neurological:      General: No focal deficit present.      Mental Status: He is alert and oriented to person, place, and time.   Psychiatric:         Mood and Affect: Mood normal.         Behavior: Behavior normal.        Result Review :         Suture Removal    Date/Time: 7/8/2022 9:45 AM  Performed by: Tanya Randall APRN  Authorized by: Tanya Randall APRN   Consent: Verbal consent obtained.  Consent given by: patient  Patient understanding: " patient states understanding of the procedure being performed  Patient consent: the patient's understanding of the procedure matches consent given  Patient identity confirmed: verbally with patient  Body area: upper extremity  Location details: right wrist  Wound Appearance: clean  Sutures Removed: 4  Post-removal: Steri-Strips applied and dressing applied  Patient tolerance: patient tolerated the procedure well with no immediate complications  Comments: Pt instructed to leave steri-strips in place for 2 days and keep wound dry.             Assessment and Plan   Diagnoses and all orders for this visit:    1. Encounter for removal of sutures (Primary)  -     Suture Removal    2. Enlarged lymph node in neck  -     Cancel: US head neck soft tissue; Future  -     CBC & Differential; Future  -     US Head Neck Soft Tissue; Future             Follow Up   Return in about 1 month (around 8/8/2022), or if symptoms worsen or fail to improve, for Annual physical.  Patient was given instructions and counseling regarding his condition or for health maintenance advice. Please see specific information pulled into the AVS if appropriate.       Answers for HPI/ROS submitted by the patient on 7/7/2022  What is the primary reason for your visit?: Other  Please describe your symptoms.: Remove stiches. Swollen lymp node in neck.  Have you had these symptoms before?: No  How long have you been having these symptoms?: Greater than 2 weeks

## 2022-07-11 ENCOUNTER — LAB (OUTPATIENT)
Dept: FAMILY MEDICINE CLINIC | Facility: CLINIC | Age: 32
End: 2022-07-11

## 2022-07-11 DIAGNOSIS — R59.0 ENLARGED LYMPH NODE IN NECK: ICD-10-CM

## 2022-07-11 LAB
ERYTHROCYTE [DISTWIDTH] IN BLOOD BY AUTOMATED COUNT: 12.7 % (ref 12.3–15.4)
HCT VFR BLD AUTO: 45 % (ref 37.5–51)
HGB BLD-MCNC: 14.7 G/DL (ref 13–17.7)
LYMPHOCYTES # BLD AUTO: 2.6 10*3/MM3 (ref 0.7–3.1)
LYMPHOCYTES NFR BLD AUTO: 33.5 % (ref 19.6–45.3)
MCH RBC QN AUTO: 28.8 PG (ref 26.6–33)
MCHC RBC AUTO-ENTMCNC: 32.6 G/DL (ref 31.5–35.7)
MCV RBC AUTO: 88.3 FL (ref 79–97)
MONOCYTES # BLD AUTO: 0.6 10*3/MM3 (ref 0.1–0.9)
MONOCYTES NFR BLD AUTO: 7.1 % (ref 5–12)
NEUTROPHILS NFR BLD AUTO: 4.6 10*3/MM3 (ref 1.7–7)
NEUTROPHILS NFR BLD AUTO: 59.4 % (ref 42.7–76)
PLATELET # BLD AUTO: 322 10*3/MM3 (ref 140–450)
PMV BLD AUTO: 7.2 FL (ref 6–12)
RBC # BLD AUTO: 5.1 10*6/MM3 (ref 4.14–5.8)
WBC NRBC COR # BLD: 7.8 10*3/MM3 (ref 3.4–10.8)

## 2022-07-11 PROCEDURE — 85025 COMPLETE CBC W/AUTO DIFF WBC: CPT | Performed by: NURSE PRACTITIONER

## 2022-07-11 PROCEDURE — 36415 COLL VENOUS BLD VENIPUNCTURE: CPT | Performed by: NURSE PRACTITIONER

## 2022-08-02 ENCOUNTER — HOSPITAL ENCOUNTER (OUTPATIENT)
Dept: ULTRASOUND IMAGING | Facility: HOSPITAL | Age: 32
Discharge: HOME | End: 2022-08-02
Admitting: NURSE PRACTITIONER

## 2022-08-02 ENCOUNTER — TELEPHONE (OUTPATIENT)
Dept: FAMILY MEDICINE CLINIC | Facility: CLINIC | Age: 32
End: 2022-08-02

## 2022-08-02 DIAGNOSIS — R59.0 ENLARGED LYMPH NODE IN NECK: ICD-10-CM

## 2022-08-02 DIAGNOSIS — R59.0 ENLARGED LYMPH NODE IN NECK: Primary | ICD-10-CM

## 2022-08-02 PROCEDURE — 76536 US EXAM OF HEAD AND NECK: CPT

## 2022-08-03 NOTE — TELEPHONE ENCOUNTER
Left voicemail that pathological means can be serious will  call patient once CT scan has been set up.

## 2022-08-10 ENCOUNTER — TELEPHONE (OUTPATIENT)
Dept: FAMILY MEDICINE CLINIC | Facility: CLINIC | Age: 32
End: 2022-08-10

## 2022-08-10 NOTE — TELEPHONE ENCOUNTER
Caller: [facility]    Relationship: Provider    Best call back number: [phone]    What was the call regarding: FRANCISCO FROM PRIOR [facility] DEPARTMENT WAS CALLING TO LET THE OFFICE KNOW THAT THE PA FOR THE CT SOFT TISSUE SCAN THAT THE PATIENT HAS TOMORROW HAS NOT BEEN APPROVED YET.     PLEASE CALL FRANCISCO TO VERIFY THAT IT IS OKAY TO RE-SCHEDULE THE PATIENT. FRANCISCO STATES THIS HAS TO BE DONE BEFORE 3 PM TODAY.     Do you require a callback: YES, ATTEMPTED WARM TRANSFER, NO ANSWER.

## 2022-08-10 NOTE — TELEPHONE ENCOUNTER
Can we check on what is needed to get this approved, I do not have any prior messages requesting a peer to peer.  The CT scan was suggested by radiologist based on ultrasound findings.

## 2022-08-10 NOTE — TELEPHONE ENCOUNTER
Caller: [facility]    Relationship to patient: OTHER    Best call back number:[phone]    Patient is needing: FRANCISCO IS CALLING BACK FOR AN  UPDATE ON THIS REQUEST. THEY ARE NEEDED A RESPONSE BY 3 PM TODAY.

## 2022-08-11 ENCOUNTER — APPOINTMENT (OUTPATIENT)
Dept: CT IMAGING | Facility: HOSPITAL | Age: 32
End: 2022-08-11

## 2022-08-26 ENCOUNTER — HOSPITAL ENCOUNTER (OUTPATIENT)
Dept: CT IMAGING | Facility: HOSPITAL | Age: 32
Discharge: HOME | End: 2022-08-26
Admitting: INTERNAL MEDICINE

## 2022-08-26 DIAGNOSIS — R59.0 ENLARGED LYMPH NODE IN NECK: ICD-10-CM

## 2022-08-26 PROCEDURE — 25010000002 IOPAMIDOL 61 % SOLUTION: Performed by: INTERNAL MEDICINE

## 2022-08-26 PROCEDURE — 70491 CT SOFT TISSUE NECK W/DYE: CPT

## 2022-08-26 RX ADMIN — IOPAMIDOL 100 ML: 612 INJECTION, SOLUTION INTRAVENOUS at 18:15

## 2022-08-29 ENCOUNTER — DOCUMENTATION (OUTPATIENT)
Dept: FAMILY MEDICINE CLINIC | Facility: CLINIC | Age: 32
End: 2022-08-29

## 2022-08-29 DIAGNOSIS — E04.1 THYROID NODULE: Primary | ICD-10-CM

## 2022-08-29 NOTE — PROGRESS NOTES
8/29/2022 1:06 PM    CT scan of the neck came back with multiple enlarged lymph nodes there is also an ill-defined density in the right lobe of the thyroid gland about 2 cm.  There is also coarse calcifications.  Radiologist is recommending CT scan of the chest and biopsy.  Patient was informed of these results and agrees to the ENT consult and CT of the chest.  These are being ordered.

## 2022-09-02 ENCOUNTER — TRANSCRIBE ORDERS (OUTPATIENT)
Dept: GENERAL RADIOLOGY | Facility: HOSPITAL | Age: 32
End: 2022-09-02

## 2022-09-02 DIAGNOSIS — E04.1 RIGHT THYROID NODULE: Primary | ICD-10-CM

## 2022-09-02 DIAGNOSIS — R59.0 CERVICAL LYMPHADENOPATHY: ICD-10-CM

## 2022-09-19 ENCOUNTER — APPOINTMENT (OUTPATIENT)
Dept: RADIOLOGY | Facility: HOSPITAL | Age: 32
End: 2022-09-19

## 2022-09-19 ENCOUNTER — HOSPITAL ENCOUNTER (OUTPATIENT)
Dept: ULTRASOUND IMAGING | Facility: HOSPITAL | Age: 32
Discharge: HOME | End: 2022-09-19

## 2022-09-19 ENCOUNTER — APPOINTMENT (OUTPATIENT)
Dept: ULTRASOUND IMAGING | Facility: HOSPITAL | Age: 32
End: 2022-09-19

## 2022-09-19 ENCOUNTER — HOSPITAL ENCOUNTER (OUTPATIENT)
Dept: CT IMAGING | Facility: HOSPITAL | Age: 32
Discharge: HOME | End: 2022-09-19

## 2022-09-19 ENCOUNTER — HOSPITAL ENCOUNTER (OUTPATIENT)
Dept: ULTRASOUND IMAGING | Facility: HOSPITAL | Age: 32
End: 2022-09-19

## 2022-09-19 VITALS
TEMPERATURE: 98.4 F | HEART RATE: 67 BPM | RESPIRATION RATE: 16 BRPM | WEIGHT: 315 LBS | HEIGHT: 78 IN | SYSTOLIC BLOOD PRESSURE: 133 MMHG | OXYGEN SATURATION: 97 % | DIASTOLIC BLOOD PRESSURE: 81 MMHG | BODY MASS INDEX: 36.45 KG/M2

## 2022-09-19 DIAGNOSIS — E04.1 THYROID NODULE: ICD-10-CM

## 2022-09-19 DIAGNOSIS — E04.1 RIGHT THYROID NODULE: ICD-10-CM

## 2022-09-19 DIAGNOSIS — R59.0 CERVICAL LYMPHADENOPATHY: ICD-10-CM

## 2022-09-19 PROCEDURE — 88341 IMHCHEM/IMCYTCHM EA ADD ANTB: CPT | Performed by: OTOLARYNGOLOGY

## 2022-09-19 PROCEDURE — 0 LIDOCAINE 1 % SOLUTION: Performed by: RADIOLOGY

## 2022-09-19 PROCEDURE — 76942 ECHO GUIDE FOR BIOPSY: CPT

## 2022-09-19 PROCEDURE — 88173 CYTOPATH EVAL FNA REPORT: CPT | Performed by: OTOLARYNGOLOGY

## 2022-09-19 PROCEDURE — 88342 IMHCHEM/IMCYTCHM 1ST ANTB: CPT | Performed by: OTOLARYNGOLOGY

## 2022-09-19 PROCEDURE — 88305 TISSUE EXAM BY PATHOLOGIST: CPT | Performed by: OTOLARYNGOLOGY

## 2022-09-19 PROCEDURE — 71250 CT THORAX DX C-: CPT

## 2022-09-19 RX ORDER — LIDOCAINE HYDROCHLORIDE 10 MG/ML
10 INJECTION, SOLUTION INFILTRATION; PERINEURAL ONCE
Status: COMPLETED | OUTPATIENT
Start: 2022-09-19 | End: 2022-09-19

## 2022-09-19 RX ADMIN — LIDOCAINE HYDROCHLORIDE 10 ML: 10 INJECTION, SOLUTION INFILTRATION; PERINEURAL at 12:06

## 2022-09-19 NOTE — DISCHARGE INSTRUCTIONS
EDUCATION / DISCHARGE INSTRUCTIONS  Aspiration:  An aspiration is a procedure used to take a sample of cells or tissue from a lump, mass or other area of concern.   Within a week the radiologist will send a report to your physician.  A pathologist will also examine the tissue and send a report.  THIS EDUCATION INFORMATION WAS REVIEWED PRIOR TO THE PROCEDURE AND CONSENT.  Patient initials_________________Time________________      Post Procedure:    *  Rest today (no pushing pulling or straining).   *  Slowly increase activity tomorow.    *  If you received sedation do not drive for 24 hours.   *  Keep dressing clean and dry.   *  Leave dressing on puncture site for 24 hours.    *  You may shower when dressing removed.   *  If needed, use an ice pack at the site for up to 20 minutes at a time for pain.    Call your doctor if experiencing:   *  Signs of infection such as redness, swelling, excessive pain and / or foul      smelling drainage from the puncture site.   *  Chills or fever over 101 degrees (by mouth).   *  Unrelieved pain.   *  Any new or severe symptoms.      Following the procedure:     Follow-up with the ordering physician as directed.    Continue to take other medications as directed by your physician unless  otherwise instructed.       If you have any concerns please call the Radiology Nurses Desk at [phone].  You are the most important factor in your recovery.  Follow the above instructions carefully.       EDUCATION /DISCHARGE INSTRUCTIONS  CT/US guided biopsy:  A biopsy is a procedure done to remove tissue for further analysis.  Before images are taken to locate the target area.  Images can be obtained using ultrasound, CT or MRI.  A physician will clean your skin with antiseptic soap, place a sterile towel around the site and administer a local anesthetic to numb the area.  The physician will then insert a special needle.  Sometimes images are taken of the needle after it is inserted to  ensure the needle is in the correct area to be biopsied.   A sample is obtained and sent to the laboratory for study.  Occasionally the laboratory is unable to make a diagnosis from the sample and the procedure may need to be repeated.  Within a week the radiologist will send a report to your physician.  A pathologist will also examine the tissue and send a report.    Risks of the procedure include but are not limited to:   *  Bleeding    *  Infection   *  Puncture of surrounding organs *  Death     *  Lung collapse if the biopsy is near the chest which may require insertion of a      chest tube to re-inflate the lung if severe.    Benefits of the procedure:  Using x-ray helps to locate the area that requires a biopsy. The procedure is less invasive than a surgical procedure, there are no large incisions and it does not require anesthesia.    Alternatives to the procedure:  A biopsy can be performed surgically.  Risks of a surgical biopsy include exposure to anesthesia, infection, excessive bleeding and injury to abdominal organs.  A benefit of surgical biopsy is the ability to see the area to be biopsied and remove of a larger piece of tissue.    THIS EDUCATION INFORMATION WAS REVIEWED PRIOR TO PROCEDURE AND CONSENT. Patient initials__________________Time___________________    Post Procedure:    *  Expect the biopsy site may be tender up to one week.    *  Rest today (no pushing pulling or straining).   *  Slowly increase activity tomorrow.    *  If you received sedation do not drive for 24 hours.   *  Keep dressing clean and dry.   *  Leave dressing on puncture site for 24 hours.    *  You may shower when dressing removed.  Call your doctor if experiencing:   *  Signs of infection such as redness, swelling, excessive pain and / or foul        smelling drainage from the puncture site.   *  Chills or fever over 101 degrees (by mouth).   *  Unrelieved pain.   *  Any new or severe symptoms.   *  If experiencing sudden  / severe shortness of breath or chest pain go to the       nearest emergency room.   Following the procedure:     Follow-up with the ordering physician as directed.    Continue to take other medications as directed by your physician unless    otherwise instructed.    If you have any concerns please call the Radiology Nurses Desk at [phone].  You are the most important factor in your recovery.  Follow the above instructions carefully.

## 2022-09-19 NOTE — NURSING NOTE
Patient arrived in Radiology Triage [facility] for US FNA of Thyroid.    Protective goggles and mask in place with all patient interactions today.

## 2022-09-20 ENCOUNTER — TELEPHONE (OUTPATIENT)
Dept: INTERVENTIONAL RADIOLOGY/VASCULAR | Facility: HOSPITAL | Age: 32
End: 2022-09-20

## 2022-09-20 PROBLEM — K76.0 FATTY LIVER: Status: ACTIVE | Noted: 2022-09-20

## 2022-09-20 PROBLEM — R59.1 LYMPHADENOPATHY: Status: ACTIVE | Noted: 2022-09-20

## 2022-09-20 PROBLEM — E04.1 THYROID NODULE: Status: ACTIVE | Noted: 2022-09-20

## 2022-09-20 LAB
CYTO UR: NORMAL
LAB AP CASE REPORT: NORMAL
LAB AP NON-GYN SPECIMEN ADEQUACY: NORMAL
PATH REPORT.FINAL DX SPEC: NORMAL
PATH REPORT.GROSS SPEC: NORMAL

## 2022-09-21 LAB
LAB AP CASE REPORT: NORMAL
LAB AP CLINICAL INFORMATION: NORMAL
LAB AP DIAGNOSIS COMMENT: NORMAL
PATH REPORT.FINAL DX SPEC: NORMAL
PATH REPORT.GROSS SPEC: NORMAL

## 2022-10-26 DIAGNOSIS — Z87.39 HISTORY OF GOUT: Primary | ICD-10-CM

## 2022-12-21 ENCOUNTER — TELEPHONE (OUTPATIENT)
Dept: FAMILY MEDICINE CLINIC | Facility: CLINIC | Age: 32
End: 2022-12-21

## 2022-12-21 DIAGNOSIS — E55.9 VITAMIN D DEFICIENCY: ICD-10-CM

## 2022-12-21 DIAGNOSIS — E04.1 THYROID NODULE: ICD-10-CM

## 2022-12-21 DIAGNOSIS — Z87.39 HISTORY OF GOUT: ICD-10-CM

## 2022-12-21 DIAGNOSIS — Z87.39 HISTORY OF GOUT: Primary | ICD-10-CM

## 2022-12-21 DIAGNOSIS — Z13.220 LIPID SCREENING: ICD-10-CM

## 2022-12-21 PROBLEM — C73 THYROID CANCER: Status: ACTIVE | Noted: 2022-12-21

## 2022-12-21 RX ORDER — COLCHICINE 0.6 MG/1
0.6 CAPSULE ORAL DAILY
Qty: 30 CAPSULE | Refills: 3 | Status: SHIPPED | OUTPATIENT
Start: 2022-12-21 | End: 2023-01-10 | Stop reason: ALTCHOICE

## 2022-12-21 NOTE — TELEPHONE ENCOUNTER
Caller: Matias Perez    Relationship: Self    Best call back number:     What is the best time to reach you:    Who are you requesting to speak with (clinical staff, provider,  specific staff member):     Do you know the name of the person who called:     What was the call regarding: PATIENT IS CALLING IN STATING THAT HE IS HAVING GOUT IN HIS LEFT BIG TOE AND WANTS TO KNOW IF HE NEEDS TO COME IN TO BE SEEN OR IF HE NEEDS A LAB VISIT.  I OFFERED HIM AN APPOINTMENT TOMORROW AND HE SAID THAT HE DOESN'T NEED TO WAIT THAT LONG.     Do you require a callback: YES

## 2022-12-22 DIAGNOSIS — R73.9 ELEVATED BLOOD SUGAR: Primary | ICD-10-CM

## 2022-12-22 LAB
25(OH)D3+25(OH)D2 SERPL-MCNC: 26.1 NG/ML (ref 30–100)
ALBUMIN SERPL-MCNC: 4.6 G/DL (ref 4–5)
ALBUMIN/GLOB SERPL: 1.9 {RATIO} (ref 1.2–2.2)
ALP SERPL-CCNC: 51 IU/L (ref 44–121)
ALT SERPL-CCNC: 90 IU/L (ref 0–44)
AST SERPL-CCNC: 51 IU/L (ref 0–40)
BILIRUB SERPL-MCNC: 0.4 MG/DL (ref 0–1.2)
BUN SERPL-MCNC: 17 MG/DL (ref 6–20)
BUN/CREAT SERPL: 19 (ref 9–20)
CALCIUM SERPL-MCNC: 9 MG/DL (ref 8.7–10.2)
CHLORIDE SERPL-SCNC: 103 MMOL/L (ref 96–106)
CHOLEST SERPL-MCNC: 182 MG/DL (ref 100–199)
CO2 SERPL-SCNC: 22 MMOL/L (ref 20–29)
CREAT SERPL-MCNC: 0.88 MG/DL (ref 0.76–1.27)
EGFRCR SERPLBLD CKD-EPI 2021: 116 ML/MIN/1.73
ERYTHROCYTE [DISTWIDTH] IN BLOOD BY AUTOMATED COUNT: 13.1 % (ref 11.6–15.4)
FT4I SERPL CALC-MCNC: 3.7 (ref 1.2–4.9)
GLOBULIN SER CALC-MCNC: 2.4 G/DL (ref 1.5–4.5)
GLUCOSE SERPL-MCNC: 131 MG/DL (ref 70–99)
HCT VFR BLD AUTO: 41.5 % (ref 37.5–51)
HDLC SERPL-MCNC: 35 MG/DL
HGB BLD-MCNC: 14.3 G/DL (ref 13–17.7)
LDLC SERPL CALC-MCNC: 120 MG/DL (ref 0–99)
MCH RBC QN AUTO: 29.8 PG (ref 26.6–33)
MCHC RBC AUTO-ENTMCNC: 34.5 G/DL (ref 31.5–35.7)
MCV RBC AUTO: 87 FL (ref 79–97)
PLATELET # BLD AUTO: 314 X10E3/UL (ref 150–450)
POTASSIUM SERPL-SCNC: 4.4 MMOL/L (ref 3.5–5.2)
PROT SERPL-MCNC: 7 G/DL (ref 6–8.5)
RBC # BLD AUTO: 4.8 X10E6/UL (ref 4.14–5.8)
SODIUM SERPL-SCNC: 138 MMOL/L (ref 134–144)
T3RU NFR SERPL: 33 % (ref 24–39)
T4 SERPL-MCNC: 11.3 UG/DL (ref 4.5–12)
TRIGL SERPL-MCNC: 150 MG/DL (ref 0–149)
TSH SERPL DL<=0.005 MIU/L-ACNC: 12.7 UIU/ML (ref 0.45–4.5)
URATE SERPL-MCNC: 7.4 MG/DL (ref 3.8–8.4)
VLDLC SERPL CALC-MCNC: 27 MG/DL (ref 5–40)
WBC # BLD AUTO: 5.7 X10E3/UL (ref 3.4–10.8)

## 2023-01-09 DIAGNOSIS — R73.9 ELEVATED BLOOD SUGAR: ICD-10-CM

## 2023-01-09 LAB
BUN SERPL-MCNC: 15 MG/DL (ref 6–20)
BUN/CREAT SERPL: 18.8 (ref 7–25)
CALCIUM SERPL-MCNC: 9.3 MG/DL (ref 8.6–10.5)
CHLORIDE SERPL-SCNC: 102 MMOL/L (ref 98–107)
CO2 SERPL-SCNC: 24.4 MMOL/L (ref 22–29)
CREAT SERPL-MCNC: 0.8 MG/DL (ref 0.76–1.27)
EGFRCR SERPLBLD CKD-EPI 2021: 119.1 ML/MIN/1.73
GLUCOSE SERPL-MCNC: 100 MG/DL (ref 65–99)
HBA1C MFR BLD: 5.7 % (ref 4.8–5.6)
POTASSIUM SERPL-SCNC: 4.6 MMOL/L (ref 3.5–5.2)
SODIUM SERPL-SCNC: 138 MMOL/L (ref 136–145)

## 2023-01-10 RX ORDER — COLCHICINE 0.6 MG/1
0.6 TABLET ORAL DAILY
Qty: 90 TABLET | Refills: 1 | Status: SHIPPED | OUTPATIENT
Start: 2023-01-10

## 2023-01-12 ENCOUNTER — OFFICE VISIT (OUTPATIENT)
Dept: FAMILY MEDICINE CLINIC | Facility: CLINIC | Age: 33
End: 2023-01-12
Payer: COMMERCIAL

## 2023-01-12 VITALS
SYSTOLIC BLOOD PRESSURE: 128 MMHG | BODY MASS INDEX: 36.45 KG/M2 | DIASTOLIC BLOOD PRESSURE: 82 MMHG | WEIGHT: 315 LBS | HEIGHT: 78 IN | TEMPERATURE: 98 F | OXYGEN SATURATION: 98 % | HEART RATE: 95 BPM

## 2023-01-12 DIAGNOSIS — M79.675 GREAT TOE PAIN, LEFT: ICD-10-CM

## 2023-01-12 DIAGNOSIS — Z00.00 ENCOUNTER FOR ANNUAL PHYSICAL EXAM: Primary | ICD-10-CM

## 2023-01-12 DIAGNOSIS — Z87.39 PERSONAL HISTORY OF GOUT: ICD-10-CM

## 2023-01-12 DIAGNOSIS — J30.2 SEASONAL ALLERGIES: ICD-10-CM

## 2023-01-12 DIAGNOSIS — E55.9 VITAMIN D DEFICIENCY: ICD-10-CM

## 2023-01-12 PROBLEM — C73 THYROID CANCER: Status: ACTIVE | Noted: 2022-11-07

## 2023-01-12 PROCEDURE — 99214 OFFICE O/P EST MOD 30 MIN: CPT | Performed by: NURSE PRACTITIONER

## 2023-01-12 PROCEDURE — 99395 PREV VISIT EST AGE 18-39: CPT | Performed by: NURSE PRACTITIONER

## 2023-01-12 RX ORDER — ALLOPURINOL 100 MG/1
100 TABLET ORAL DAILY
Qty: 90 TABLET | Refills: 0 | Status: SHIPPED | OUTPATIENT
Start: 2023-01-12

## 2023-01-12 RX ORDER — CETIRIZINE HYDROCHLORIDE 10 MG/1
10 TABLET ORAL
COMMUNITY
Start: 2022-09-23

## 2023-01-12 RX ORDER — INDOMETHACIN 50 MG/1
CAPSULE ORAL
COMMUNITY
Start: 2022-12-16

## 2023-01-12 RX ORDER — DIPHENOXYLATE HYDROCHLORIDE AND ATROPINE SULFATE 2.5; .025 MG/1; MG/1
TABLET ORAL
COMMUNITY

## 2023-01-12 RX ORDER — ERGOCALCIFEROL 1.25 MG/1
50000 CAPSULE ORAL WEEKLY
Qty: 12 CAPSULE | Refills: 0 | Status: SHIPPED | OUTPATIENT
Start: 2023-01-12

## 2023-01-12 RX ORDER — LEVOTHYROXINE SODIUM 0.2 MG/1
200 TABLET ORAL DAILY
COMMUNITY
Start: 2022-12-08 | End: 2023-02-06

## 2023-01-12 RX ORDER — LEVOTHYROXINE SODIUM 0.07 MG/1
TABLET ORAL
COMMUNITY
Start: 2023-01-06

## 2023-01-12 NOTE — PROGRESS NOTES
"Chief Complaint  Wellness Check (Discuss labs)    Subjective        Matias Perez presents to [facility] MEDICAL [facility] PRIMARY CARE  History of Present Illness   32-year-old male presenting for annual physical, labs recently completed by endocrinology, he is , lives with spouse, exercises occasionally, does not smoke.  He recently underwent thyroidectomy several months ago, taking Synthroid 75 MCG daily, followed by endocrinology.  He also complains of right great toe pain, history of gout, will check uric acid level and start allopurinol for maintenance therapy.  UTD on eye and dental exam.  He denies any need for medication refills at this time.    With vitamin D deficiency, no current medication, will start vitamin D 50,000 units once weekly.    With seasonal allergies, taking Zyrtec 10 mg daily.       Objective   Vital Signs:  /82 (BP Location: Left arm, Patient Position: Sitting, Cuff Size: Large Adult)   Pulse 95   Temp 98 °F (36.7 °C) (Infrared)   Ht 198.1 cm (78\")   Wt (!) 155 kg (342 lb 3.2 oz)   SpO2 98%   BMI 39.55 kg/m²   Estimated body mass index is 39.55 kg/m² as calculated from the following:    Height as of this encounter: 198.1 cm (78\").    Weight as of this encounter: 155 kg (342 lb 3.2 oz).       Class 2 Severe Obesity (BMI >=35 and <=39.9). Obesity-related health conditions include the following: none. Obesity is unchanged. BMI is is above average; BMI management plan is completed. We discussed portion control and increasing exercise.      Physical Exam  HENT:      Head: Normocephalic.   Eyes:      Comments: Wears glasses   Cardiovascular:      Rate and Rhythm: Normal rate.      Pulses: Normal pulses.      Heart sounds: Normal heart sounds.   Pulmonary:      Effort: Pulmonary effort is normal.      Breath sounds: Normal breath sounds.   Abdominal:      General: Bowel sounds are normal.      Palpations: Abdomen is soft.   Musculoskeletal:         General: Normal range of " motion.      Cervical back: Normal range of motion.   Skin:     General: Skin is warm.      Comments: Healed neck incision   Neurological:      General: No focal deficit present.      Mental Status: He is alert and oriented to person, place, and time.   Psychiatric:         Mood and Affect: Mood normal.         Behavior: Behavior normal.         Thought Content: Thought content normal.         Judgment: Judgment normal.        Result Review :                   Assessment and Plan   Diagnoses and all orders for this visit:    1. Encounter for annual physical exam (Primary)  -     Hepatitis C antibody    2. Vitamin D deficiency  -     vitamin D (ERGOCALCIFEROL) 1.25 MG (92827 UT) capsule capsule; Take 1 capsule by mouth 1 (One) Time Per Week.  Dispense: 12 capsule; Refill: 0    3. Great toe pain, left  -     allopurinol (Zyloprim) 100 MG tablet; Take 1 tablet by mouth Daily.  Dispense: 90 tablet; Refill: 0  -     Uric Acid    4. Personal history of gout  -     allopurinol (Zyloprim) 100 MG tablet; Take 1 tablet by mouth Daily.  Dispense: 90 tablet; Refill: 0  -     Uric Acid    5. Seasonal allergies      Counseling was provided on nutrition, physical activity, injury prevention, eye and dental health, patient verbalizes understanding no additional questions were asked.       Follow Up   No follow-ups on file.  Patient was given instructions and counseling regarding his condition or for health maintenance advice. Please see specific information pulled into the AVS if appropriate.     Continue medication, diet and exercise regimen. Will notify of lab results - please allow 3-5 business days for results. Keep follow-up appointment with Endocrine.     Mask and gloves worn

## 2023-01-12 NOTE — PATIENT INSTRUCTIONS
Continue medication, diet and exercise regimen. Will notify of lab results - please allow 3-5 business days for results. Keep follow-up appointment with Endocrine.

## 2023-01-13 LAB
HCV AB S/CO SERPL IA: <0.1 S/CO RATIO (ref 0–0.9)
URATE SERPL-MCNC: 8.4 MG/DL (ref 3.4–7)

## 2023-04-21 DIAGNOSIS — Z87.39 PERSONAL HISTORY OF GOUT: ICD-10-CM

## 2023-04-21 DIAGNOSIS — M79.675 GREAT TOE PAIN, LEFT: ICD-10-CM

## 2023-04-21 RX ORDER — ALLOPURINOL 100 MG/1
100 TABLET ORAL DAILY
Qty: 90 TABLET | Refills: 0 | Status: SHIPPED | OUTPATIENT
Start: 2023-04-21

## 2023-04-21 NOTE — TELEPHONE ENCOUNTER
Caller: Matias Perez    Relationship: Self    Best call back number: [phone]    Requested Prescriptions:   Requested Prescriptions     Pending Prescriptions Disp Refills   • allopurinol (Zyloprim) 100 MG tablet 90 tablet 0     Sig: Take 1 tablet by mouth Daily.        Pharmacy where request should be sent: [facility]ARMACY #[id number] [place], [place] - [address] [address]. AT [facility] [phone] [facility] [phone] FX     Last office visit with prescribing clinician: Visit date not found   Last telemedicine visit with prescribing clinician: Visit date not found   Next office visit with prescribing clinician: Visit date not found     Does the patient have less than a 3 day supply:  [x] Yes  [] No    Would you like a call back once the refill request has been completed: [x] Yes [] No    If the office needs to give you a call back, can they leave a voicemail: [x] Yes [] No    Patricio Philippe Rep   04/21/23 08:07 EDT